# Patient Record
Sex: FEMALE | Race: WHITE | NOT HISPANIC OR LATINO | URBAN - METROPOLITAN AREA
[De-identification: names, ages, dates, MRNs, and addresses within clinical notes are randomized per-mention and may not be internally consistent; named-entity substitution may affect disease eponyms.]

---

## 2021-07-20 ENCOUNTER — EVALUATION (OUTPATIENT)
Dept: PHYSICAL THERAPY | Facility: CLINIC | Age: 71
End: 2021-07-20
Payer: COMMERCIAL

## 2021-07-20 DIAGNOSIS — Z96.642 STATUS POST TOTAL REPLACEMENT OF LEFT HIP: Primary | ICD-10-CM

## 2021-07-20 PROBLEM — J30.2 SEASONAL ALLERGIES: Status: ACTIVE | Noted: 2021-07-20

## 2021-07-20 PROBLEM — J30.2 SEASONAL ALLERGIES: Status: RESOLVED | Noted: 2021-07-20 | Resolved: 2021-07-20

## 2021-07-20 PROCEDURE — 97162 PT EVAL MOD COMPLEX 30 MIN: CPT

## 2021-07-20 NOTE — PROGRESS NOTES
PT Evaluation     Today's date: 2021  Patient name: Juan Jose Martin  : 1950  MRN: [id number]  Referring provider: Isabella Garcia MD  Dx: No diagnosis found  Assessment  Assessment details: Pt is a 70y  o  female who presents to OP PT for IE s/p L VENKTA on 21 following a fall and resulting hip fx  Pt was treated at [facility] and then completed 8 day stay in [facility] for rehabilitation  Upon formal assessment pt demonstrates decreased L hip abduction, extension, IR & ER strength  ROM WFL  Gait deviations include: antalgic gait pattern (decreased stance time on R, decreased L stride length), decreased gait speed  Pt currently ambulates with quad cane in community and occasionally w/o AD at household level  Balance deficits also noted as pt is unable to maintain Tandem stance for >10sec B and was witnessed with LOB to L side following STS transfer  Pt able to self-correct with stepping strategy  Given these findings pt is recommend for skilled therapy intervention 2x week  Pt agreeable to this plan  HEP to be given at first f/u  Impairments: abnormal gait, abnormal or restricted ROM, impaired balance, impaired physical strength, lacks appropriate home exercise program and pain with function  Understanding of Dx/Px/POC: good  Goals  STGs (to be achieved within 2 weeks)  1  Pt will be educated in initial HEP and handouts will be provided  2  Pt will demonstrate improvements in gait quality evident through increased stance time on L and symmetrical stride length  LTGs (to be achieved within 8-10 weeks)   1  Pt will be I with HEP at d/c to promote PT carry-over  2  Pt will meet FOTO predicted score  3  Pt will be able to ambulate household level and community level (on level surfaces) distances w/o need of AD (includes pt stated goals)         Plan  Patient would benefit from: skilled physical therapy  Planned modality interventions: TENS, unattended electrical stimulation, thermotherapy: hydrocollator packs and cryotherapy  Planned therapy interventions: balance, body mechanics training, manual therapy, massage, Dueñas taping, neuromuscular re-education, patient education, strengthening, stretching, therapeutic activities, flexibility, functional ROM exercises, gait training, graded activity, graded exercise, home exercise program, therapeutic exercise, therapeutic training and transfer training  Frequency: 2x week  Duration in weeks: 8  Treatment plan discussed with: patient        Subjective Evaluation    History of Present Illness  Date of surgery: 2021  Mechanism of injury: Pt fell over garden hose resulting in L hip fx resulting in L VENKAT in 21  She was seen at [facility] where surgery was performed  Following this pt was sent to [facility] for 8 days to receive PT  She was seen by PT 2x in 8 day stay and reports "rehabing herself"  She is currently completing self-prescribed HEP  Pt is no longer taking prescription pain medication  She is taking OTC ibuprofen  No restrictions/post-op precautions     Pain  Current pain ratin  At best pain ratin  At worst pain rating: 3  Relieving factors: medications and rest  Aggravating factors: walking    Social Support  Stairs in house: yes (step-to pattern)   12  Lives in: multiple-level home    Treatments  Previous treatment: physical therapy  Discharged from (in last 30 days): skilled nursing facility  Patient Goals  Patient goal: Walk without cane        Objective     Active Range of Motion   Left Hip   Normal active range of motion    Right Hip   Normal active range of motion    Strength/Myotome Testing     Left Hip   Planes of Motion   Flexion: 4-  Extension: 4-  Abduction: 4-  External rotation: 3+  Internal rotation: 3+    Right Hip   Planes of Motion   Flexion: 4+  Extension: 4+  Abduction: 4+  External rotation: 4  Internal rotation: 4    Left Knee   Flexion: 4+  Extension: 4+    Right Knee   Flexion: 4+  Extension: 4+    Ambulation     Comments    Ambulates with quad cane  Gait deviations: antalgic gait pattern (decreased stance time on R, decreased L stride length), decreased gait speed    Functional Assessment        Comments  Tandem stance:     R foot behind 7sec     L foot behind 5 sec             Precautions: HTN  HEP: give at first f/u      Manuals 7/20                                                                Neuro Re-Ed             FT on foam             Tandem stance on firm             Tandem amb  Rocker board balance                                                    Ther Ex             HEP NV            NuStep vs recumbent bike             clamshells             Standing hip 3 way kicks             S/l hip abd  SLR             Prone hip ext   SLR                                       Ther Activity             Fwd step-ups             Lateral step-ups                                       Gait Training             Amb w/o AD                          Modalities

## 2021-07-20 NOTE — LETTER
2021    Diana Srinivasan [id number]  [id number] [id number]    Patient: Andreas Padgett   YOB: 1950   Date of Visit: 2021     Encounter Diagnosis     ICD-10-CM    1  Status post total replacement of left hip  [id number]        Dear Dr Cyndi Canchola: Thank you for your recent referral of Andreas Padgett  Please review the attached evaluation summary from Татьяна's recent visit  Please verify that you agree with the plan of care by signing the attached order  If you have any questions or concerns, please do not hesitate to call  I sincerely appreciate the opportunity to share in the care of one of your patients and hope to have another opportunity to work with you in the near future  Sincerely,    Mirta Darling, PT      Referring Provider:      I certify that I have read the below Plan of Care and certify the need for these services furnished under this plan of treatment while under my care  Bonnie Motta MD  [id number]  [address] [facility] [address]  Via Mail          PT Evaluation     Today's date: 2021  Patient name: Andreas Padgett  : 1950  MRN: [id number]  Referring provider: Fayetta Nageotte, MD  Dx: No diagnosis found  Assessment  Assessment details: Pt is a 70y  o  female who presents to OP PT for IE s/p L VENKAT on 21 following a fall and resulting hip fx  Pt was treated at [facility] and then completed 8 day stay in [facility] for rehabilitation  Upon formal assessment pt demonstrates decreased L hip abduction, extension, IR & ER strength  ROM WFL  Gait deviations include: antalgic gait pattern (decreased stance time on R, decreased L stride length), decreased gait speed  Pt currently ambulates with quad cane in community and occasionally w/o AD at household level  Balance deficits also noted as pt is unable to maintain Tandem stance for >10sec B and was witnessed with LOB to L side following STS transfer   Pt able to self-correct with stepping strategy  Given these findings pt is recommend for skilled therapy intervention 2x week  Pt agreeable to this plan  HEP to be given at first f/u  Impairments: abnormal gait, abnormal or restricted ROM, impaired balance, impaired physical strength, lacks appropriate home exercise program and pain with function  Understanding of Dx/Px/POC: good  Goals  STGs (to be achieved within 2 weeks)  1  Pt will be educated in initial HEP and handouts will be provided  2  Pt will demonstrate improvements in gait quality evident through increased stance time on L and symmetrical stride length  LTGs (to be achieved within 8-10 weeks)   1  Pt will be I with HEP at d/c to promote PT carry-over  2  Pt will meet FOTO predicted score  3  Pt will be able to ambulate household level and community level (on level surfaces) distances w/o need of AD (includes pt stated goals)  Plan  Patient would benefit from: skilled physical therapy  Planned modality interventions: TENS, unattended electrical stimulation, thermotherapy: hydrocollator packs and cryotherapy  Planned therapy interventions: balance, body mechanics training, manual therapy, massage, Dueñas taping, neuromuscular re-education, patient education, strengthening, stretching, therapeutic activities, flexibility, functional ROM exercises, gait training, graded activity, graded exercise, home exercise program, therapeutic exercise, therapeutic training and transfer training  Frequency: 2x week  Duration in weeks: 8  Treatment plan discussed with: patient        Subjective Evaluation    History of Present Illness  Date of surgery: 6/25/2021  Mechanism of injury: Pt fell over garden hose resulting in L hip fx resulting in L VENKAT in 6/25/21  She was seen at [facility] where surgery was performed  Following this pt was sent to [facility] for 8 days to receive PT  She was seen by PT 2x in 8 day stay and reports "rehabing herself"   She is currently completing self-prescribed HEP  Pt is no longer taking prescription pain medication  She is taking OTC ibuprofen  No restrictions/post-op precautions  Pain  Current pain ratin  At best pain ratin  At worst pain rating: 3  Relieving factors: medications and rest  Aggravating factors: walking    Social Support  Stairs in house: yes (step-to pattern)   12  Lives in: multiple-level home    Treatments  Previous treatment: physical therapy  Discharged from (in last 30 days): skilled nursing facility  Patient Goals  Patient goal: Walk without cane        Objective     Active Range of Motion   Left Hip   Normal active range of motion    Right Hip   Normal active range of motion    Strength/Myotome Testing     Left Hip   Planes of Motion   Flexion: 4-  Extension: 4-  Abduction: 4-  External rotation: 3+  Internal rotation: 3+    Right Hip   Planes of Motion   Flexion: 4+  Extension: 4+  Abduction: 4+  External rotation: 4  Internal rotation: 4    Left Knee   Flexion: 4+  Extension: 4+    Right Knee   Flexion: 4+  Extension: 4+    Ambulation     Comments    Ambulates with quad cane  Gait deviations: antalgic gait pattern (decreased stance time on R, decreased L stride length), decreased gait speed    Functional Assessment        Comments  Tandem stance:     R foot behind 7sec     L foot behind 5 sec             Precautions: HTN  HEP: give at first f/u      Manuals                                                                 Neuro Re-Ed             FT on foam             Tandem stance on firm             Tandem amb  Rocker board balance                                                    Ther Ex             HEP NV            NuStep vs recumbent bike             clamshells             Standing hip 3 way kicks             S/l hip abd  SLR             Prone hip ext   SLR                                       Ther Activity             Fwd step-ups             Lateral step-ups Gait Training             Amb w/o AD                          Modalities

## 2021-07-23 ENCOUNTER — OFFICE VISIT (OUTPATIENT)
Dept: PHYSICAL THERAPY | Facility: CLINIC | Age: 71
End: 2021-07-23
Payer: COMMERCIAL

## 2021-07-23 DIAGNOSIS — Z96.642 STATUS POST TOTAL REPLACEMENT OF LEFT HIP: Primary | ICD-10-CM

## 2021-07-23 PROCEDURE — 97110 THERAPEUTIC EXERCISES: CPT

## 2021-07-23 NOTE — PROGRESS NOTES
Daily Note     Today's date: 2021  Patient name: Juan Leigh  : 1950  MRN: [id number]  Referring provider: Patricia Vital MD  Dx:   Encounter Diagnosis     ICD-10-CM    1  Status post total replacement of left hip  [id number]                   Subjective: Pt with no complaints of pain pre or post session  Does note L hip soreness  Objective: See treatment diary below      Assessment: Tolerated treatment well  Patient demonstrated fatigue post treatment, exhibited good technique with therapeutic exercises and would benefit from continued PT  Majority of session focused on developing HEP and educating pt on proper completion  Pt with no remaining questions at this time  Following HEP balance interventions performed, pt with greatest difficulty with EC activities  No overt LOB noted  Plan: Continue per plan of care  Initiate step-ups NV     Precautions: HTN  HEP: bridges, SLR, s/l clamshells, standing hip abd, marching      Manuals                                                                Neuro Re-Ed             FT on foam  EO/E 2x30"           Tandem stance on firm  2x30"           Tandem amb  Rocker board balance                                                    Ther Ex             HEP NV 2x10 ea  See above           NuStep vs recumbent bike  NuStep x7' L3           clamshells  2x10 B            Standing hip 3 way kicks             S/l hip abd  SLR             Prone hip ext   SLR                                       Ther Activity             Fwd step-ups             Lateral step-ups                                       Gait Training             Amb w/o AD                          Modalities

## 2021-07-27 ENCOUNTER — OFFICE VISIT (OUTPATIENT)
Dept: PHYSICAL THERAPY | Facility: CLINIC | Age: 71
End: 2021-07-27
Payer: COMMERCIAL

## 2021-07-27 DIAGNOSIS — Z96.642 STATUS POST TOTAL REPLACEMENT OF LEFT HIP: Primary | ICD-10-CM

## 2021-07-27 PROCEDURE — 97112 NEUROMUSCULAR REEDUCATION: CPT

## 2021-07-27 PROCEDURE — 97110 THERAPEUTIC EXERCISES: CPT

## 2021-07-27 NOTE — PROGRESS NOTES
Daily Note     Today's date: 2021  Patient name: Lauren Cruz  : 1950  MRN: [id number]  Referring provider: Madelyn Marshall MD  Dx:   Encounter Diagnosis     ICD-10-CM    1  Status post total replacement of left hip  [id number]                   Subjective: Pt with no complaints of pain  She has begun taking iron supplement following results lab results  Pt reports improvements in fatigue and SOB since beginning this  Objective: See treatment diary below      Assessment: Tolerated treatment well  Patient demonstrated fatigue post treatment, exhibited good technique with therapeutic exercises and would benefit from continued PT  Pt with difficulties w/ clamshells and L s/l hip abd SLR, complaints of pain and "shakiness", advised pt to lift to just before a sharp shooting pain  Would benefit from continued balance interventions as pt exhibited several episodes of LOB  Was able to self-correct with use of // bars  High guard arm position also observed  Plan: Continue per plan of care  Precautions: HTN  HEP: bridges, SLR, s/l clamshells, standing hip abd, marching      Manuals                                                               Neuro Re-Ed             FT on foam  EO/E 2x30" EO/EC 2x30"          Tandem stance on firm  2x30" 2x30"          Tandem amb  Rocker board balance                                                    Ther Ex             HEP NV 2x10 ea  See above           NuStep vs recumbent bike  NuStep x7' L3 NuStep x8' L3          clamshells  2x10 B  2x10 B          Standing hip 3 way kicks   2x10 B          S/l hip abd  SLR   2x10 B          Prone hip ext   SLR             Supine SLR   2x10          Bridges   2x10                                                 Ther Activity             Fwd step-ups   x10 alt 0UE, no riser          Lateral step-ups                                       Gait Training             Amb w/o AD   x3 laps // bars Modalities

## 2021-07-29 ENCOUNTER — OFFICE VISIT (OUTPATIENT)
Dept: PHYSICAL THERAPY | Facility: CLINIC | Age: 71
End: 2021-07-29
Payer: COMMERCIAL

## 2021-07-29 DIAGNOSIS — Z96.642 STATUS POST TOTAL REPLACEMENT OF LEFT HIP: Primary | ICD-10-CM

## 2021-07-29 PROCEDURE — 97110 THERAPEUTIC EXERCISES: CPT

## 2021-07-29 PROCEDURE — 97530 THERAPEUTIC ACTIVITIES: CPT

## 2021-07-29 NOTE — PROGRESS NOTES
Daily Note     Today's date: 2021  Patient name: Shantelle Lyons  : 1950  MRN: [id number]  Referring provider: Sadi Alegria MD  Dx:   Encounter Diagnosis     ICD-10-CM    1  Status post total replacement of left hip  [id number]                   Subjective: Pt with no new complaints  Continues to note improvements in overall with taking iron supplements  Objective: See treatment diary below      Assessment: Tolerated treatment well  Patient demonstrated fatigue post treatment and would benefit from continued PT  Initiated tandem ambulation today, notes "pinching" in hip with this exercise  Pt continues to require verbal cues for proper completion of s/l hip abd  Plan: Continue per plan of care  Precautions: HTN  HEP: bridges, SLR, s/l clamshells, standing hip abd, marching      Manuals                                                              Neuro Re-Ed             FT on foam  EO/E 2x30" EO/EC 2x30" EO/EC 2x30"         Tandem stance on firm  2x30" 2x30" 2x30"         Tandem amb     x3 laps // bars         Rocker board balance                                                    Ther Ex             HEP NV 2x10 ea  See above           NuStep vs recumbent bike  NuStep x7' L3 NuStep x8' L3 NuStep x8' L3         clamshells  2x10 B  2x10 B 2x10 B         Standing hip 3 way kicks   2x10 B 2x10 B (no flex today)         S/l hip abd  SLR   2x10 B 2x10 B         Prone hip ext   SLR             Supine SLR   2x10 2x10         Bridges   2x10 2x10 3"                                                Ther Activity             Fwd step-ups   x10 alt 0UE, no riser x10 alt 0UE, no riser         Lateral step-ups                                       Gait Training             Amb w/o AD   x3 laps // bars 3x // bars                      Modalities

## 2025-04-22 ENCOUNTER — HOSPITAL ENCOUNTER (OUTPATIENT)
Dept: HOSPITAL 99 - RAD | Age: 75
End: 2025-04-22
Payer: COMMERCIAL

## 2025-04-22 DIAGNOSIS — M25.519: ICD-10-CM

## 2025-04-22 DIAGNOSIS — M25.531: Primary | ICD-10-CM

## 2025-04-22 DIAGNOSIS — S16.1XXD: ICD-10-CM

## 2025-04-22 DIAGNOSIS — M25.579: ICD-10-CM

## 2025-04-22 DIAGNOSIS — M25.562: Primary | ICD-10-CM

## 2025-04-22 DIAGNOSIS — M25.532: ICD-10-CM

## 2025-04-22 DIAGNOSIS — S39.012D: ICD-10-CM
